# Patient Record
Sex: MALE | Race: WHITE | Employment: FULL TIME | URBAN - METROPOLITAN AREA
[De-identification: names, ages, dates, MRNs, and addresses within clinical notes are randomized per-mention and may not be internally consistent; named-entity substitution may affect disease eponyms.]

---

## 2019-08-18 ENCOUNTER — HOSPITAL ENCOUNTER (EMERGENCY)
Age: 49
Discharge: HOME | End: 2019-08-18
Payer: COMMERCIAL

## 2019-08-18 VITALS
WEIGHT: 225 LBS | RESPIRATION RATE: 16 BRPM | SYSTOLIC BLOOD PRESSURE: 150 MMHG | HEART RATE: 70 BPM | OXYGEN SATURATION: 98 % | DIASTOLIC BLOOD PRESSURE: 94 MMHG | TEMPERATURE: 98.3 F

## 2019-08-18 DIAGNOSIS — H66.90 ACUTE OTITIS MEDIA, UNSPECIFIED OTITIS MEDIA TYPE: Primary | ICD-10-CM

## 2019-08-18 DIAGNOSIS — J01.90 ACUTE SINUSITIS, RECURRENCE NOT SPECIFIED, UNSPECIFIED LOCATION: ICD-10-CM

## 2019-08-18 PROCEDURE — 99212 OFFICE O/P EST SF 10 MIN: CPT

## 2019-08-18 RX ORDER — AMOXICILLIN AND CLAVULANATE POTASSIUM 875; 125 MG/1; MG/1
1 TABLET, FILM COATED ORAL 2 TIMES DAILY
Qty: 20 TABLET | Refills: 0 | Status: SHIPPED | OUTPATIENT
Start: 2019-08-18 | End: 2019-08-28

## 2019-08-18 ASSESSMENT — PAIN DESCRIPTION - ORIENTATION: ORIENTATION: LEFT

## 2019-08-18 ASSESSMENT — PAIN DESCRIPTION - DESCRIPTORS: DESCRIPTORS: PRESSURE

## 2019-08-18 ASSESSMENT — PAIN SCALES - GENERAL: PAINLEVEL_OUTOF10: 5

## 2019-08-18 ASSESSMENT — PAIN DESCRIPTION - LOCATION: LOCATION: EAR

## 2019-08-18 NOTE — LETTER
[facility]  [address] [facility] [address]  Phone: [phone]               August 18, 2019    Patient: Sindy Archibald   YOB: 1970   Date of Visit: 8/18/2019       To Whom It May Concern:    Asher Tobin was seen and treated in our emergency department on 8/18/2019. He was absent from work today due to illness.       Sincerely,       ERIC Marrero CNP         Signature:__________________________________

## 2019-08-18 NOTE — ED PROVIDER NOTES
HPI: Adebayo Huynh is a 47 y.o. male with a past medical history of  has a past medical history of Cancer ([employer] [facility] [address] Deaf, left, and Hypertension. presenting with complaints of a sinus congestion, ear pain, and  nasal congestion. States he has been sick for 2 weeks. He has a yellow nasal discharge. He said that his sinuses are painful. He said that he has a cough but that seems to be postnasal drip not into his chest.  He did not does not have a fever or neck stiffness. He is been taking Sudafed and over-the-counter cold medicine but he said is not getting better and now his left ear is very painful and feels plugged.   Review of Systems:   Pertinent positives and negatives are stated within HPI, all other systems reviewed and are negative.          --------------------------------------------- PAST HISTORY ---------------------------------------------  Past Medical History:  has a past medical history of Cancer ([employer] [facility] [employer] [facility] [address] Deaf, left, and Hypertension. Past Surgical History:  has no past surgical history on file. Social History:  reports that he has been smoking cigarettes. He has been smoking about 1.50 packs per day. He does not have any smokeless tobacco history on file. Family History: family history is not on file. The patients home medications have been reviewed. Allergies: Codeine    ------------------------- NURSING NOTES AND VITALS REVIEWED ---------------------------   The nursing notes within the ED encounter and vital signs as below have been reviewed by myself. BP (!) 150/94   Pulse 70   Temp 98.3 °F (36.8 °C) (Oral)   Resp 16   SpO2 98%   Oxygen Saturation Interpretation: Normal    The patients available past medical records and past encounters were reviewed. Physical exam:  Constitutional: Vital signs are reviewed the patient is comfortable. The patient is alert and oriented and conversant. Head: The head is atraumatic and normocephalic.   Eyes: No discharge is present from the eyes. The sclera are normal.  ENT: The oropharynx demonstrates a small amount of erythema bilaterally. There is no tonsillar enlargement nor is there any exudate present. No uvular deviation or edema. No tonsillary asymmetry.  Floor of the mouth soft, no trismus, handling secretions. TMs on left is erythematous and bulging, right is normal.  Bilateral ear canals are clear  Neck: Normal range of motion is achieved in the neck. There is no JVD present. No meningeal signs are present   Anterior cervical adenopathy is negative. Respiratory/chest: The chest is nontender. Breath sounds are normal. There is no respiratory distress.   Cardiovascular: Heart shows a regular rate and rhythm without murmurs clicks or gallops. Abdominal exam: The abdomen is non tender without evidence of peritoneal signs. Specific attention to the left upper quadrant with palpation of the spleen demonstrates no organomegaly or tenderness  Skin: warm and dry, without rash  Neurologic: GCS 15   Psych: Normal Affect  -------------------------------------------------- RESULTS -------------------------------------------------    LABS:  No results found for this visit on 08/18/19. RADIOLOGY:  Interpreted by Radiologist.  No orders to display       ------------------------------ ED COURSE/MEDICAL DECISION MAKING----------------------  Medications - No data to display    ED COURSE:         Medical Decision Making:         With sinusitis and otitis. I did put him on Augmentin. He can continue to take the Sudafed as needed for symptom control Tylenol or ibuprofen as needed for pain. If he does not improve or worsens he should return or go to the ED for reevaluation. He should also follow-up with his PCP  Counseling: The emergency provider has spoken with the patient and discussed todays results, in addition to providing specific details for the plan of care and counseling regarding the diagnosis and prognosis.   Questions are